# Patient Record
Sex: FEMALE | Race: WHITE | NOT HISPANIC OR LATINO | URBAN - METROPOLITAN AREA
[De-identification: names, ages, dates, MRNs, and addresses within clinical notes are randomized per-mention and may not be internally consistent; named-entity substitution may affect disease eponyms.]

---

## 2022-05-27 ENCOUNTER — LAB OUTSIDE AN ENCOUNTER (OUTPATIENT)
Dept: URBAN - METROPOLITAN AREA CLINIC 31 | Facility: CLINIC | Age: 57
End: 2022-05-27

## 2022-05-27 ENCOUNTER — OFFICE VISIT (OUTPATIENT)
Dept: URBAN - METROPOLITAN AREA CLINIC 31 | Facility: CLINIC | Age: 57
End: 2022-05-27
Payer: COMMERCIAL

## 2022-05-27 VITALS
SYSTOLIC BLOOD PRESSURE: 136 MMHG | HEART RATE: 114 BPM | HEIGHT: 68 IN | BODY MASS INDEX: 22.58 KG/M2 | WEIGHT: 149 LBS | OXYGEN SATURATION: 100 % | DIASTOLIC BLOOD PRESSURE: 84 MMHG

## 2022-05-27 DIAGNOSIS — Z86.010 PERSONAL HISTORY OF COLONIC POLYPS: ICD-10-CM

## 2022-05-27 DIAGNOSIS — R76.8 ABNORMAL ANCA (ANTINEUTROPHIL CYTOPLASMIC ANTIBODY): ICD-10-CM

## 2022-05-27 DIAGNOSIS — R74.8 ALKALINE PHOSPHATASE ELEVATION: ICD-10-CM

## 2022-05-27 DIAGNOSIS — R74.01 TRANSAMINASEMIA: ICD-10-CM

## 2022-05-27 PROCEDURE — 99204 OFFICE O/P NEW MOD 45 MIN: CPT | Performed by: INTERNAL MEDICINE

## 2022-05-27 RX ORDER — SODIUM PICOSULFATE, MAGNESIUM OXIDE, AND ANHYDROUS CITRIC ACID 10; 3.5; 12 MG/160ML; G/160ML; G/160ML
160 ML LIQUID ORAL
Qty: 320 ML | Refills: 0 | OUTPATIENT
Start: 2022-05-27 | End: 2022-05-28

## 2022-05-27 RX ORDER — MULTIVITAMIN
CAPSULE ORAL
Status: DISCONTINUED | COMMUNITY

## 2022-05-27 RX ORDER — METHOCARBAMOL 750 MG/1
1 TABLET, FILM COATED ORAL PRN
Status: ACTIVE | COMMUNITY

## 2022-05-27 RX ORDER — ACETAMINOPHEN 500 MG
TABLET ORAL
Status: DISCONTINUED | COMMUNITY

## 2022-05-27 RX ORDER — ONDANSETRON 4 MG/1
1 TABLET ON THE TONGUE AND ALLOW TO DISSOLVE TABLET, ORALLY DISINTEGRATING ORAL
Qty: 5 | Refills: 1 | OUTPATIENT
Start: 2022-05-27

## 2022-05-27 RX ORDER — ACETAMINOPHEN 325 MG/1
1 TABLET AS NEEDED TABLET, FILM COATED ORAL
Status: DISCONTINUED | COMMUNITY

## 2022-05-27 RX ORDER — NAPROXEN SODIUM 220 MG
1 TABLET AS NEEDED TABLET ORAL
Status: ACTIVE | COMMUNITY

## 2022-05-27 NOTE — HPI-SURVEILLANCE COLONOSCOPY
Patient is a 57 year old female and presents today for a consultation to discuss getting a surveillance colonoscopy. She has a personal history of colonic polyps (tubular adenoma in 2012) and her last colonoscopy was done in 2014 with Dr. Ford. Patient denies a family history of colon cancer. Patient currently admits 1 bowel movement per day with normal and formed stools. She denies any blood or mucous in her stool. Patient denies melena. Patient denies sleep apnea. Patient admits extreme nausea and vomiting with anesthesia and states that she has trouble waking up. There is no history of spinal cord injuries.

## 2022-05-27 NOTE — HPI-ELEVATED LIVER ENZYMES
Patient presents today for consultation about recent elevated liver enzymes. Patient admits a history of elevated liver enzymes, in 2021. Patient currently denies jaundice, RUQ pains, dizziness. Patient states that she does get chilled easily. Patient does state that she bruises easily. Patient admits extreme fatigue. Patient has RA and fibromyalgia. She was recently (about a week ago) weaned off of Savella. Patient was weaned off of Leflunomide about 3 months ago. 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 She does take a supplement called Immune Support which includes zinc, vitamin C, elderberry, turmeric, bina, curcumin, D3, and echinacea.  Patient denies alcohol, drugs, travel outside the [place], NSAIDs, protein supplements,  service, blood transfusion, family history of liver disease or cancer, personal exposure to liver diseases, [facility], or rehab. Patient has her ears pierced and she also has 1 tattoo.   Lab results from 04/21/2022 were as follows : Sed. rate was normal. CBC, all results were normal. CMP, all results were normal except for the following, ALT was HIGH @ 84, AST was HIGH @ 39, Alk Phos. was HIGH @ 150. C-Reactive, Vitamin D, Hepatitis panel, and Quantiferon TB were all normal.

## 2022-06-09 LAB
ACTIN (SMOOTH MUSCLE) ANTIBODY: 4
ALBUMIN: 4.8
ALKALINE PHOSPHATASE: 136
ALT (SGPT): 63
ANTI-SLA, IGG: 0.6
ANTINUCLEAR ANTIBODIES, IFA: NEGATIVE
AST (SGOT): 35
BILIRUBIN, DIRECT: <0.1
BILIRUBIN, TOTAL: <0.2
FERRITIN, SERUM: 29
FREE THYROXINE INDEX: 2
IRON BIND.CAP.(TIBC): 415
IRON SATURATION: 15
IRON: 64
LIVER-KIDNEY MICROSOMAL AB: 0.7
MITOCHONDRIAL (M2) ANTIBODY: <20
PROTEIN, TOTAL: 7.7
T3 UPTAKE: 25
THYROXINE (T4): 8
TSH: 1.45
UIBC: 351

## 2022-06-10 ENCOUNTER — TELEPHONE ENCOUNTER (OUTPATIENT)
Dept: URBAN - METROPOLITAN AREA CLINIC 36 | Facility: CLINIC | Age: 57
End: 2022-06-10

## 2022-07-01 ENCOUNTER — OFFICE VISIT (OUTPATIENT)
Dept: URBAN - METROPOLITAN AREA MEDICAL CENTER 10 | Facility: MEDICAL CENTER | Age: 57
End: 2022-07-01
Payer: COMMERCIAL

## 2022-07-01 DIAGNOSIS — K51.80 CHRONIC PANCOLONIC ULCERATIVE COLITIS: ICD-10-CM

## 2022-07-01 DIAGNOSIS — K63.89 BACTERIAL OVERGROWTH SYNDROME: ICD-10-CM

## 2022-07-01 DIAGNOSIS — D12.4 ADENOMA OF DESCENDING COLON: ICD-10-CM

## 2022-07-01 PROCEDURE — 45380 COLONOSCOPY AND BIOPSY: CPT | Performed by: INTERNAL MEDICINE

## 2022-07-01 PROCEDURE — 45385 COLONOSCOPY W/LESION REMOVAL: CPT | Performed by: INTERNAL MEDICINE

## 2022-07-26 ENCOUNTER — OFFICE VISIT (OUTPATIENT)
Dept: URBAN - METROPOLITAN AREA CLINIC 31 | Facility: CLINIC | Age: 57
End: 2022-07-26
Payer: COMMERCIAL

## 2022-07-26 ENCOUNTER — DASHBOARD ENCOUNTERS (OUTPATIENT)
Age: 57
End: 2022-07-26

## 2022-07-26 VITALS
SYSTOLIC BLOOD PRESSURE: 126 MMHG | HEART RATE: 111 BPM | BODY MASS INDEX: 23.04 KG/M2 | WEIGHT: 152 LBS | DIASTOLIC BLOOD PRESSURE: 82 MMHG | OXYGEN SATURATION: 100 % | HEIGHT: 68 IN

## 2022-07-26 DIAGNOSIS — R76.8 ABNORMAL ANCA (ANTINEUTROPHIL CYTOPLASMIC ANTIBODY): ICD-10-CM

## 2022-07-26 DIAGNOSIS — Z86.010 PERSONAL HISTORY OF COLONIC POLYPS: ICD-10-CM

## 2022-07-26 DIAGNOSIS — R74.01 TRANSAMINASEMIA: ICD-10-CM

## 2022-07-26 DIAGNOSIS — R74.8 ALKALINE PHOSPHATASE ELEVATION: ICD-10-CM

## 2022-07-26 DIAGNOSIS — Z87.19 HISTORY OF INFLAMMATORY BOWEL DISEASE: ICD-10-CM

## 2022-07-26 DIAGNOSIS — D12.4 ADENOMATOUS POLYP OF DESCENDING COLON: ICD-10-CM

## 2022-07-26 PROBLEM — 428283002: Status: ACTIVE | Noted: 2022-05-27

## 2022-07-26 PROCEDURE — 99214 OFFICE O/P EST MOD 30 MIN: CPT | Performed by: INTERNAL MEDICINE

## 2022-07-26 RX ORDER — ONDANSETRON 4 MG/1
1 TABLET ON THE TONGUE AND ALLOW TO DISSOLVE TABLET, ORALLY DISINTEGRATING ORAL
Qty: 5 | Refills: 1 | Status: DISCONTINUED | COMMUNITY
Start: 2022-05-27

## 2022-07-26 RX ORDER — NAPROXEN SODIUM 220 MG
1 TABLET AS NEEDED TABLET ORAL
Status: ACTIVE | COMMUNITY

## 2022-07-26 RX ORDER — METHOCARBAMOL 750 MG/1
1 TABLET, FILM COATED ORAL PRN
Status: ACTIVE | COMMUNITY

## 2022-07-26 NOTE — HPI-COLONOSCOPY FOLLOWUP
Patient presents today for a follow-up after the colonoscopy that was done on 07/01/2022. The ultrasound was one on 06/06/2022 and the results are in EMR. Patient completed the blood work on 06/02/2022 and those results are also in EMR. Since the procedure patient states that she  is having 1 bowel movement per day with normal and  formed stools. Patient denies seeing any blood or mucous in the stool. She had some bleeding right after the colonoscopy but this stopped after one day. Patient denies melena. Patient states that she had no complications following her colonoscopy.

## 2022-08-08 ENCOUNTER — TELEPHONE ENCOUNTER (OUTPATIENT)
Dept: URBAN - METROPOLITAN AREA CLINIC 36 | Facility: CLINIC | Age: 57
End: 2022-08-08

## 2023-02-27 ENCOUNTER — TELEPHONE ENCOUNTER (OUTPATIENT)
Dept: URBAN - METROPOLITAN AREA CLINIC 36 | Facility: CLINIC | Age: 58
End: 2023-02-27

## 2023-08-25 ENCOUNTER — TELEPHONE ENCOUNTER (OUTPATIENT)
Dept: URBAN - METROPOLITAN AREA CLINIC 31 | Facility: CLINIC | Age: 58
End: 2023-08-25